# Patient Record
(demographics unavailable — no encounter records)

---

## 2025-04-15 NOTE — PLAN
[FreeTextEntry1] : Patient presents f/u.  #HTN BP at goal C/w Lisinopril 10mg qd  #breast lump repeat u/s in a few months; ordered  #toe issues Unclear if neuropathy from past injury Will do podiatry eval  #cognitive issues Will refer to neuropsych

## 2025-04-15 NOTE — PHYSICAL EXAM
[No Acute Distress] : no acute distress [Well Nourished] : well nourished [Well Developed] : well developed [Well-Appearing] : well-appearing [Normal Sclera/Conjunctiva] : normal sclera/conjunctiva [Normal Outer Ear/Nose] : the outer ears and nose were normal in appearance [No JVD] : no jugular venous distention [No Respiratory Distress] : no respiratory distress  [No Accessory Muscle Use] : no accessory muscle use [Clear to Auscultation] : lungs were clear to auscultation bilaterally [Normal Rate] : normal rate  [Regular Rhythm] : with a regular rhythm [Normal S1, S2] : normal S1 and S2 [No Murmur] : no murmur heard [No Edema] : there was no peripheral edema [Normal Gait] : normal gait [Normal Affect] : the affect was normal [Normal Insight/Judgement] : insight and judgment were intact [de-identified] : tiny blister in mid chest area; left toenail bruising.

## 2025-04-15 NOTE — HISTORY OF PRESENT ILLNESS
[FreeTextEntry1] : f/u  [de-identified] : Patient presents for f/u on BP. her lisinopril was reduced in 9/2024 due to lightheadedness.   She is on Myrbetriq for overactive bladder. Has noted some cognitive issues recently.  Spelling has been a slight issue.  Occasional memory and time issues also occur.   Has a small blister in the chest area.   Has bruising in the left lower toe area with numbness for the past few years.  Attributes this frost. due for a repeat breast u/s in a few months for her breast lump.